# Patient Record
Sex: FEMALE | Race: WHITE | URBAN - METROPOLITAN AREA
[De-identification: names, ages, dates, MRNs, and addresses within clinical notes are randomized per-mention and may not be internally consistent; named-entity substitution may affect disease eponyms.]

---

## 2018-08-22 ENCOUNTER — OFFICE (OUTPATIENT)
Dept: URBAN - METROPOLITAN AREA CLINIC 78 | Facility: CLINIC | Age: 78
End: 2018-08-22
Payer: COMMERCIAL

## 2018-08-22 VITALS
HEART RATE: 74 BPM | SYSTOLIC BLOOD PRESSURE: 140 MMHG | HEIGHT: 63 IN | DIASTOLIC BLOOD PRESSURE: 73 MMHG | WEIGHT: 171 LBS | TEMPERATURE: 97.6 F

## 2018-08-22 DIAGNOSIS — Z80.0 FAMILY HISTORY OF MALIGNANT NEOPLASM OF DIGESTIVE ORGANS: ICD-10-CM

## 2018-08-22 DIAGNOSIS — R19.5 OTHER FECAL ABNORMALITIES: ICD-10-CM

## 2018-08-22 PROCEDURE — 99204 OFFICE O/P NEW MOD 45 MIN: CPT

## 2018-08-22 NOTE — SERVICEHPINOTES
MARIAM WALKER   is a   78   year old    female who is being seen in consultation at the request of   KAREN STEINBERG   for positive FOBT. 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 Her last colonoscopy was 5/2011, no polyps, repeat 5 years due to family hx of colon cancer in her father. She did not follow up due to her age and she believes she is too old for screening. She has not seen any  blood in the stool or melena alhough she states gets dark stool when she eats a lot of chocolate. No GI complaints. BMs usually twice day although she recently lost weight intentionally to help improve her RA and stool is not as consistent. No abdominal pain, n/v, weight loss, fever, or chills. She denies any cardiac or pulmonary issues and her pain concern is RA which makes it difficult for her to get around. Her father was diagnosed with colon cancer when he was in his late 70s but she states he had it for awhile before he went to the doctor.

## 2023-03-30 ENCOUNTER — INPATIENT HOSPITAL (OUTPATIENT)
Dept: URBAN - METROPOLITAN AREA HOSPITAL 62 | Facility: HOSPITAL | Age: 83
End: 2023-03-30
Payer: COMMERCIAL

## 2023-03-30 DIAGNOSIS — D64.9 ANEMIA, UNSPECIFIED: ICD-10-CM

## 2023-03-30 DIAGNOSIS — K92.1 MELENA: ICD-10-CM

## 2023-03-30 DIAGNOSIS — R19.5 OTHER FECAL ABNORMALITIES: ICD-10-CM

## 2023-03-30 PROCEDURE — 99222 1ST HOSP IP/OBS MODERATE 55: CPT | Performed by: NURSE PRACTITIONER

## 2023-03-31 ENCOUNTER — INPATIENT HOSPITAL (OUTPATIENT)
Dept: URBAN - METROPOLITAN AREA HOSPITAL 62 | Facility: HOSPITAL | Age: 83
End: 2023-03-31
Payer: COMMERCIAL

## 2023-03-31 DIAGNOSIS — K20.90 ESOPHAGITIS, UNSPECIFIED WITHOUT BLEEDING: ICD-10-CM

## 2023-03-31 DIAGNOSIS — K44.9 DIAPHRAGMATIC HERNIA WITHOUT OBSTRUCTION OR GANGRENE: ICD-10-CM

## 2023-03-31 DIAGNOSIS — K29.60 OTHER GASTRITIS WITHOUT BLEEDING: ICD-10-CM

## 2023-03-31 DIAGNOSIS — K26.0 ACUTE DUODENAL ULCER WITH HEMORRHAGE: ICD-10-CM

## 2023-03-31 DIAGNOSIS — D64.9 ANEMIA, UNSPECIFIED: ICD-10-CM

## 2023-03-31 DIAGNOSIS — K31.4 GASTRIC DIVERTICULUM: ICD-10-CM

## 2023-03-31 PROCEDURE — 43255 EGD CONTROL BLEEDING ANY: CPT | Performed by: INTERNAL MEDICINE
